# Patient Record
Sex: FEMALE | Race: ASIAN | NOT HISPANIC OR LATINO | Employment: FULL TIME | URBAN - METROPOLITAN AREA
[De-identification: names, ages, dates, MRNs, and addresses within clinical notes are randomized per-mention and may not be internally consistent; named-entity substitution may affect disease eponyms.]

---

## 2018-11-02 ENCOUNTER — TELEPHONE (OUTPATIENT)
Dept: INTERNAL MEDICINE | Facility: CLINIC | Age: 28
End: 2018-11-02

## 2018-11-05 ENCOUNTER — LAB VISIT (OUTPATIENT)
Dept: LAB | Facility: HOSPITAL | Age: 28
End: 2018-11-05
Attending: FAMILY MEDICINE
Payer: COMMERCIAL

## 2018-11-05 ENCOUNTER — OFFICE VISIT (OUTPATIENT)
Dept: INTERNAL MEDICINE | Facility: CLINIC | Age: 28
End: 2018-11-05
Payer: COMMERCIAL

## 2018-11-05 VITALS
BODY MASS INDEX: 21.14 KG/M2 | TEMPERATURE: 98 F | HEART RATE: 70 BPM | DIASTOLIC BLOOD PRESSURE: 60 MMHG | OXYGEN SATURATION: 96 % | SYSTOLIC BLOOD PRESSURE: 106 MMHG | HEIGHT: 62 IN | WEIGHT: 114.88 LBS

## 2018-11-05 DIAGNOSIS — Z13.1 SCREENING FOR DIABETES MELLITUS: ICD-10-CM

## 2018-11-05 DIAGNOSIS — Z30.41 ORAL CONTRACEPTIVE PILL SURVEILLANCE: ICD-10-CM

## 2018-11-05 DIAGNOSIS — Z13.220 SCREENING FOR LIPID DISORDERS: ICD-10-CM

## 2018-11-05 DIAGNOSIS — Z11.3 ROUTINE SCREENING FOR STI (SEXUALLY TRANSMITTED INFECTION): ICD-10-CM

## 2018-11-05 DIAGNOSIS — Z00.00 PREVENTATIVE HEALTH CARE: ICD-10-CM

## 2018-11-05 DIAGNOSIS — Z00.00 PREVENTATIVE HEALTH CARE: Primary | ICD-10-CM

## 2018-11-05 DIAGNOSIS — Z11.4 SCREENING FOR HIV WITHOUT PRESENCE OF RISK FACTORS: ICD-10-CM

## 2018-11-05 PROCEDURE — 86592 SYPHILIS TEST NON-TREP QUAL: CPT

## 2018-11-05 PROCEDURE — 90686 IIV4 VACC NO PRSV 0.5 ML IM: CPT | Mod: S$GLB,,, | Performed by: FAMILY MEDICINE

## 2018-11-05 PROCEDURE — 82947 ASSAY GLUCOSE BLOOD QUANT: CPT

## 2018-11-05 PROCEDURE — 36415 COLL VENOUS BLD VENIPUNCTURE: CPT | Mod: PO

## 2018-11-05 PROCEDURE — 80061 LIPID PANEL: CPT

## 2018-11-05 PROCEDURE — 99999 PR PBB SHADOW E&M-EST. PATIENT-LVL III: CPT | Mod: PBBFAC,,, | Performed by: FAMILY MEDICINE

## 2018-11-05 PROCEDURE — 99385 PREV VISIT NEW AGE 18-39: CPT | Mod: 25,S$GLB,, | Performed by: FAMILY MEDICINE

## 2018-11-05 PROCEDURE — 86703 HIV-1/HIV-2 1 RESULT ANTBDY: CPT

## 2018-11-05 PROCEDURE — 90471 IMMUNIZATION ADMIN: CPT | Mod: S$GLB,,, | Performed by: FAMILY MEDICINE

## 2018-11-05 RX ORDER — NORGESTIMATE AND ETHINYL ESTRADIOL 7DAYSX3 28
KIT ORAL
COMMUNITY
Start: 2018-09-24 | End: 2018-11-06 | Stop reason: SDUPTHER

## 2018-11-05 NOTE — PROGRESS NOTES
CHIEF COMPLAINT  Annual Exam      HISTORY OF PRESENT ILLNESS (PREVENTIVE SERVICES)    HEALTH MAINTENANCE INTERVENTIONS - UP TO DATE  Health Maintenance Topics with due status: Not Due       Topic Last Completion Date    Pap Smear 05/05/2016    TETANUS VACCINE 11/05/2017       HEALTH MAINTENANCE INTERVENTIONS - DUE OR DUE SOON  Health Maintenance Due   Topic Date Due    Lipid Panel  1992    HPV Vaccines (1 - Female 3-dose series) 09/22/2003       · HYPERTENSION SCREENING: negative  BP Readings from Last 3 Encounters:   11/05/18 106/60        · OBESITY SCREENING: negative  Wt Readings from Last 3 Encounters:   11/05/18 52.1 kg (114 lb 13.8 oz)     BMI Readings from Last 3 Encounters:   11/05/18 21.01 kg/m²        · DYSLIPIDEMIA SCREENING: ordered  No results found for: CHOL, TRIG, HDL, LDLCALC, NONHDLCHOL    · DIABETES SCREENING: ordered  No results found for: HGBA1C, GLU    · HIV SCREENING: ordered  No results found for: [id number]     · SEXUALLY TRANSMITTED INFECTION SCREENING: ordered  No results found for: LABCHLA, RPR     · TOBACCO USE SCREENING: negative  She  reports that  has never smoked. she has never used smokeless tobacco.    · ALCOHOL MISUSE SCREENING: negative  She  reports that she drinks about 1.2 oz of alcohol per week.    · DEPRESSION SCREENING: negative    · INTIMATE PARTNER VIOLENCE SCREENING: negative    · IMMUNIZATIONS: reported as up to date according to current CDC guidelines except for flu (done today). We are requesting old records.    · VISION SCREENING: current - not needed at this time     · CERVICAL CANCER SCREENING: Has GYN appointment later this week for Pap.    · BREAST CANCER SCREENING: not indicated    · FOLIC ACID SUPPLEMENTATION: recommended    · CONTRACEPTION: OCPs. She is satisfied with current contraception and wants to continue. She reports no history of venous thromboembolism or increased risk for such. She reports no history of migraines.    EVALUATION & MANAGEMENT  "UNRELATED TO PREVENTIVE SERVICES  · NONE     Problem List Items Addressed This Visit     None      Visit Diagnoses     Preventative health care    -  Primary    Relevant Orders    Lipid panel    GLUCOSE, RANDOM    HIV 1 / 2 ANTIBODY    RPR    PREGNANCY TEST, URINE RAPID (Completed)    Screening for HIV without presence of risk factors        Relevant Orders    HIV 1 / 2 ANTIBODY    Routine screening for STI (sexually transmitted infection)        Relevant Orders    RPR    Screening for diabetes mellitus        Relevant Orders    GLUCOSE, RANDOM    Screening for lipid disorders        Relevant Orders    Lipid panel    Oral contraceptive pill surveillance        Relevant Medications    norgestimate-ethinyl estradiol (ORTHO TRI-CYCLEN,TRI-SPRINTEC) 0.18/0.215/0.25 mg-35 mcg (28) tablet    Other Relevant Orders    PREGNANCY TEST, URINE RAPID (Completed)          REVIEW OF SYSTEMS  Answers for HPI/ROS submitted by the patient on 11/4/2018   activity change: No  unexpected weight change: No  neck pain: No  hearing loss: No  rhinorrhea: No  trouble swallowing: No  eye discharge: No  visual disturbance: No  chest tightness: No  wheezing: No  chest pain: No  palpitations: No  blood in stool: No  constipation: No  vomiting: No  diarrhea: No  polydipsia: No  polyuria: No  difficulty urinating: No  hematuria: No  menstrual problem: No  dysuria: No  joint swelling: No  arthralgias: No  headaches: No  weakness: No  confusion: No  dysphoric mood: No    PHYSICAL EXAM  Vitals:    11/05/18 1622   BP: 106/60   BP Location: Right arm   Patient Position: Sitting   BP Method: Medium (Manual)   Pulse: 70   Temp: 98.1 °F (36.7 °C)   TempSrc: Tympanic   SpO2: 96%   Weight: 52.1 kg (114 lb 13.8 oz)   Height: 5' 2" (1.575 m)     CONSTITUTIONAL: Vital signs noted. No apparent distress. Does not appear acutely ill or septic. Appears adequately hydrated.  EYE: Sclerae anicteric. Lids and conjunctiva unremarkable.  ENT: External ENT unremarkable. " Oropharynx moist. Lips and tongue unremarkable. Ear canals and visualized tympanic membranes are unremarkable. Hearing grossly intact.  NECK: Trachea midline. Thyroid nontender.  LYMPH: No cervical or supraclavicular lymphadenopathy.  PULM: Lungs clear. Breathing unlabored.  CV: Auscultation reveals regular rate and rhythm without murmur, gallop or rub. No carotid bruit.   GI: Soft and nontender. Bowel sounds normal. No appreciable organomegaly or abdominal mass on palpation. Abdominal aorta nonpalpable. No abdominal bruit.  DERM: Skin warm and moist with normal turgor.  NEURO: There are no gross focal motor deficits or gross deficits of cranial nerves III-XII.  PSYCH: Alert and oriented x 3. Mood is grossly neutral. Affect appropriate. Judgment and insight not grossly compromised.  MSK: Grossly normal stance and gait.    PAST MEDICAL HISTORY, FAMILY HISTORY, SOCIAL HISTORY, CURRENT MEDICATION LIST, and ALLERGY LIST reviewed by me (RENA Son MD) and are updated consistent with the patient's report.    ASSESSMENT and PLAN  Preventative health care  -     Lipid panel; Future; Expected date: 11/05/2018  -     GLUCOSE, RANDOM; Future; Expected date: 11/05/2018  -     HIV 1 / 2 ANTIBODY; Future; Expected date: 11/05/2018  -     RPR; Future; Expected date: 11/05/2018  -     PREGNANCY TEST, URINE RAPID; Future; Expected date: 11/05/2018    Screening for HIV without presence of risk factors  -     HIV 1 / 2 ANTIBODY; Future; Expected date: 11/05/2018    Routine screening for STI (sexually transmitted infection)  -     RPR; Future; Expected date: 11/05/2018    Screening for diabetes mellitus  -     GLUCOSE, RANDOM; Future; Expected date: 11/05/2018    Screening for lipid disorders  -     Lipid panel; Future; Expected date: 11/05/2018    Oral contraceptive pill surveillance  -     PREGNANCY TEST, URINE RAPID; Future; Expected date: 11/05/2018  -     norgestimate-ethinyl estradiol (ORTHO TRI-CYCLEN,TRI-SPRINTEC)  0.18/0.215/0.25 mg-35 mcg (28) tablet; Take 1 tablet by mouth once daily.  Dispense: 168 tablet; Refill: 2    Other orders  -     Influenza - Quadrivalent (3 years & older) (PF)           Medication List           Accurate as of 11/5/18 11:59 PM. If you have any questions, ask your nurse or doctor.               CHANGE how you take these medications    norgestimate-ethinyl estradiol 0.18/0.215/0.25 mg-35 mcg (28) tablet  Commonly known as:  ORTHO TRI-CYCLEN,TRI-SPRINTEC  Take 1 tablet by mouth once daily.  What changed:    · how much to take  · how to take this  · when to take this  Changed by:  CONRADO Son MD           Where to Get Your Medications      These medications were sent to [facility]/pharmacy #[id number] [address] [address]    Phone:  [phone]   · norgestimate-ethinyl estradiol 0.18/0.215/0.25 mg-35 mcg (28) tablet         Follow-up in about 1 year (around 11/5/2019) for wellness and preventive services.    ABOUT THIS DOCUMENTATION:  · The order of the conditions listed in the HPI is one of convenience and does not necessarily reflect the chronology of the appointment, nor the relative importance of a condition. It is possible that additional description or status details about condition(s) may be found elsewhere in the documentation for today's encounter.  · Documentation entered by me for this encounter was done in part using speech-recognition technology. Although I have made an effort to ensure accuracy, malapropisms may exist and should be interpreted in context.                        -RENA Son MD    There are no Patient Instructions on file for this visit.

## 2018-11-06 LAB
CHOLEST SERPL-MCNC: 189 MG/DL
CHOLEST/HDLC SERPL: 2.5 {RATIO}
GLUCOSE SERPL-MCNC: 84 MG/DL
HDLC SERPL-MCNC: 77 MG/DL
HDLC SERPL: 40.7 %
LDLC SERPL CALC-MCNC: 95.4 MG/DL
NONHDLC SERPL-MCNC: 112 MG/DL
TRIGL SERPL-MCNC: 83 MG/DL

## 2018-11-06 RX ORDER — NORGESTIMATE AND ETHINYL ESTRADIOL 7DAYSX3 28
1 KIT ORAL DAILY
Qty: 168 TABLET | Refills: 2 | Status: SHIPPED | OUTPATIENT
Start: 2018-11-06 | End: 2018-11-12 | Stop reason: SDUPTHER

## 2018-11-07 LAB
HIV 1+2 AB+HIV1 P24 AG SERPL QL IA: NEGATIVE
RPR SER QL: NORMAL

## 2018-11-10 ENCOUNTER — PATIENT MESSAGE (OUTPATIENT)
Dept: INTERNAL MEDICINE | Facility: CLINIC | Age: 28
End: 2018-11-10

## 2018-11-10 DIAGNOSIS — Z30.41 ORAL CONTRACEPTIVE PILL SURVEILLANCE: ICD-10-CM

## 2018-11-10 NOTE — PROGRESS NOTES
"Hi, Paris.    I have reviewed the results of recent tests you had done.    I'm happy to report that your test results are ALL NORMAL.    Your screening tests for HIV and syphilis are NEGATIVE (NORMAL).    Your lipid (cholesterol) panel shows that your cholesterol levels are EXCELLENT.    Your glucose (blood sugar level) is NORMAL.    If you have any questions about your test results, write them down and bring them with you to your next appointment. You can call or message me in the meantime if you have urgent questions.    Thank you for letting me care for you. I look forward to seeing you at your next appointment.    Sincerely,    RENA Son MD    P.S. - Want to learn more about your test results and what they mean? It's as simple as 1, 2, 3.     (1) Log in to your [facility] account at [email or web address]     (2) From the "View test results" tab, click on the test you want to know more about.     (3) Click on the "About This Test" link."

## 2018-11-12 RX ORDER — NORGESTIMATE AND ETHINYL ESTRADIOL 7DAYSX3 28
1 KIT ORAL DAILY
Qty: 168 TABLET | Refills: 2 | Status: SHIPPED | OUTPATIENT
Start: 2018-11-12

## 2018-11-12 NOTE — TELEPHONE ENCOUNTER
Patient is requesting medication to be sent to [facility]. As her insurance does not use [facility]. Please advise.//ddw

## 2018-11-12 NOTE — TELEPHONE ENCOUNTER
Paris Priec.    I sent your electronic prescription for norgestimate-ethinyl estradiol (ORTHO TRI-CYCLEN,TRI-SPRINTEC) 0.18/0.215/0.25 mg-35 mcg (28) tablet, a quantity of 168 tablet(s) with 2 additional refills to [facility] - [address], and the pharmacy confirmed their receipt of the prescription 11/12/2018 at 8:32 AM CST.    Thanks for letting me care for you.    Sincerely,    RENA Son MD

## 2018-12-18 ENCOUNTER — OFFICE VISIT (OUTPATIENT)
Dept: OBSTETRICS AND GYNECOLOGY | Facility: CLINIC | Age: 28
End: 2018-12-18
Payer: COMMERCIAL

## 2018-12-18 VITALS
DIASTOLIC BLOOD PRESSURE: 62 MMHG | SYSTOLIC BLOOD PRESSURE: 100 MMHG | BODY MASS INDEX: 22.6 KG/M2 | WEIGHT: 122.81 LBS | HEIGHT: 62 IN

## 2018-12-18 DIAGNOSIS — Z30.41 ORAL CONTRACEPTIVE PILL SURVEILLANCE: ICD-10-CM

## 2018-12-18 DIAGNOSIS — Z30.41 ENCOUNTER FOR SURVEILLANCE OF CONTRACEPTIVE PILLS: ICD-10-CM

## 2018-12-18 DIAGNOSIS — Z01.419 ENCOUNTER FOR GYNECOLOGICAL EXAMINATION WITHOUT ABNORMAL FINDING: Primary | ICD-10-CM

## 2018-12-18 PROCEDURE — 99999 PR PBB SHADOW E&M-EST. PATIENT-LVL III: CPT | Mod: PBBFAC,,, | Performed by: NURSE PRACTITIONER

## 2018-12-18 PROCEDURE — 99385 PREV VISIT NEW AGE 18-39: CPT | Mod: S$GLB,,, | Performed by: NURSE PRACTITIONER

## 2018-12-18 PROCEDURE — 88175 CYTOPATH C/V AUTO FLUID REDO: CPT

## 2018-12-18 NOTE — LETTER
December 18, 2018      No Recipients           [facility] - OB/ GYN  [address]  [place] [address]  Phone: [phone]  Fax: [phone]          Patient: Paris Levy   MR Number: [id number]   YOB: 1990   Date of Visit: 12/18/2018       Dear :    Thank you for referring Paris Levy to me for evaluation. Attached you will find relevant portions of my assessment and plan of care.    If you have questions, please do not hesitate to call me. I look forward to following Paris Leyv along with you.    Sincerely,    Ada Taveras, NP    Enclosure  CC:  No Recipients    If you would like to receive this communication electronically, please contact [email or web address] or [phone] to request more information on [facility] access.    For providers and/or their staff who would like to refer a patient to [facility], please contact us through our one-stop-shop provider referral line, Patrick Sullivan, at [phone].    If you feel you have received this communication in error or would no longer like to receive these types of communications, please e-mail [email or web address]

## 2018-12-18 NOTE — PATIENT INSTRUCTIONS
The Range of Pap Test Results  When your Pap test is sent to the lab, the lab studies your cell samples and reports any abnormal cell changes. Your health care provider can discuss these changes with you. In some cases, an abnormal Pap test is due to an infection. More serious cell changes range from dysplasia to cancer. Talk to your health care provider about your Pap test.    Normal results  Cervical cells, even normal ones, are always changing. As they mature, normal squamous cells move from deeper layers within the cervix. Over time, these cells flatten and cover the surface of the cervix. Within the cervical canal, the cells are different. These glandular cells are taller and not as flat as the cells on the surface of the cervix. When a Pap test sample shows healthy cells of both types, the results are negative. Keep having Pap tests as often as directed.  Abnormal results  A positive Pap test result means some cells in the sample showed abnormal changes. These results are grouped by the type of cell change and the location, or extent, of the changes. Depending on the results, you may need further testing.  · Inflammation: Noncancerous changes are present. They may be due to normal cell repair. Or, they may be caused by an infection, such as HPV or yeast. Further testing may be needed. (Also called reactive cellular changes.)  · Atypical squamous cells: Test results are unclear. Cells on the surface of the cervix show changes, but their significance is not yet known. Testing for HPV and other sexually transmitted infections (STIs) may be needed. Treatment may be required. (Reported as ASC-US or ASC-H.)  · Atypical glandular cells: Cells lining the cervical canal show abnormal changes. Further testing is likely. You may also have treatment to destroy or remove problem cells. (Reported as AGC.)  · Mild dysplasia: Cells show distinct changes. More testing or HPV typing may be done. You may also have treatment to  destroy or remove problem cells. (Reported as low-grade JUAN or THERESE 1.)  · Moderate to severe dysplasia: Cells show precancerous changes. Or, noninvasive cancer (carcinoma in situ) may be present. Treatment to destroy or remove problem cells is likely. (Reported as high-grade JUAN or THERESE 2 or THERESE 3.)  · Cancer: Different types of cancer may be detected by your Pap test. More tests to assess the cancer's extent are likely. The type of treatment will depend on the test results and other factors, such as age and health history. (Reported as squamous cell carcinoma, endocervical adenocarcinoma in situ, or adenocarcinoma.)  Date Last Reviewed: 5/12/2015  © 4868-6180 [employer]. [address] [place] [address], [place] [address]. All rights reserved. This information is not intended as a substitute for professional medical care. Always follow your healthcare professional's instructions.        Birth Control: The Pill    Birth control pills contain hormones that help prevent pregnancy. The pills are prescribed by your healthcare provider. There are many types of birth control pills available. If you have side effects from one type of pill, tell your healthcare provider. He or she may be able to prescribe a pill that works better for you.  Pregnancy rates  Talk to your healthcare provider about the effectiveness of this birth control method.  Using the pill  · Take one pill daily. Take it at around the same time each day.  · Follow your healthcare providers guidelines on when to start your first pack of pills. You may need to use another form of birth control for a week or more after you start.  · Know what to do if you forget to take a pill. (Consult your healthcare provider or check the package.) If you miss more than one pill, you may need to use a backup method of birth control for a week or more.  Pros  · Low pregnancy rate  · No interruption to sex  · Easy to use  · Can help make periods more regular  · May lower  your risk of ovarian cysts and certain cancers  · May decrease menstrual cramps, menstrual flow, and acne  Cons  · Does not protect against sexually transmitted infection (STIs)  · Requires taking a pill on time each day  · May not work as well when taken with certain other medicines (check with your pharmacist)  · May cause side effects such as nausea, irregular bleeding, headaches, breast tenderness, fatigue, or mood changes (these often go away within 3 months)  · May increase the risk of blood clots, heart attack, and stroke  The pill may not be for you  The pill may not be for you if:  · You are a smoker and over age 35  · You have high blood pressure or gallbladder, liver, cerebrovascular  or heart disease  · You have diabetes, migraines, blood clot in the vein or artery, lupus, depression, certain lipid disorders, or take medicines that interfere with the pill  In these cases, discuss the risks with your healthcare provider.  Date Last Reviewed: 3/1/2017  © 5017-2868 [employer]. [address] [place] [address], [place] [address]. All rights reserved. This information is not intended as a substitute for professional medical care. Always follow your healthcare professional's instructions.

## 2019-01-02 ENCOUNTER — PATIENT MESSAGE (OUTPATIENT)
Dept: OBSTETRICS AND GYNECOLOGY | Facility: CLINIC | Age: 29
End: 2019-01-02

## 2019-03-27 ENCOUNTER — OFFICE VISIT (OUTPATIENT)
Dept: OTOLARYNGOLOGY | Facility: CLINIC | Age: 29
End: 2019-03-27
Payer: COMMERCIAL

## 2019-03-27 VITALS
BODY MASS INDEX: 22.14 KG/M2 | SYSTOLIC BLOOD PRESSURE: 122 MMHG | HEART RATE: 74 BPM | TEMPERATURE: 99 F | WEIGHT: 121.06 LBS | DIASTOLIC BLOOD PRESSURE: 71 MMHG

## 2019-03-27 DIAGNOSIS — J30.89 NON-SEASONAL ALLERGIC RHINITIS, UNSPECIFIED TRIGGER: Primary | ICD-10-CM

## 2019-03-27 PROCEDURE — 99999 PR PBB SHADOW E&M-EST. PATIENT-LVL III: ICD-10-PCS | Mod: PBBFAC,,, | Performed by: ORTHOPAEDIC SURGERY

## 2019-03-27 PROCEDURE — 99203 OFFICE O/P NEW LOW 30 MIN: CPT | Mod: S$GLB,,, | Performed by: ORTHOPAEDIC SURGERY

## 2019-03-27 PROCEDURE — 99203 PR OFFICE/OUTPT VISIT, NEW, LEVL III, 30-44 MIN: ICD-10-PCS | Mod: S$GLB,,, | Performed by: ORTHOPAEDIC SURGERY

## 2019-03-27 PROCEDURE — 99999 PR PBB SHADOW E&M-EST. PATIENT-LVL III: CPT | Mod: PBBFAC,,, | Performed by: ORTHOPAEDIC SURGERY

## 2019-03-27 NOTE — PATIENT INSTRUCTIONS
"I would recommend the patient start on daily, consistent medication for allergies.  I would recommend daily use of an oral antihistamine as well as a steroid nasal spray.  There are a variety of oral antihistamines available over the counter, and one is not inherently the "best," though often patients will find that one works best for them.  We also discussed the proper mechanism of using a steroid nasal spray, to direct the spray away from the patient's nasal septum.  I would recommend continuing with these medications for 2-3 weeks, and then will have the patient contact me with progress.  "

## 2019-03-27 NOTE — PROGRESS NOTES
Subjective:       Patient ID: Paris Levy is a 28 y.o. female.    Chief Complaint: Otalgia (Bilateral) and Swollen lymph node right side of neck    Patient is a very pleasant 30 year old female here to see me today for evaluation of issues with her ears.  She has noted increased nasal drainage as well as sneezing and pain in her ears.  She says that the pain is intermittent, and can be in either ear.  She has noted that her ear pain is worse with swallowing.  She is able to swallow both solids and liquids, and denies any dysphagia or odynophagia.  She has not noted any hearing loss.  She does not generally an allergic individual, and does not feel that she suffers with seasonal allergies.  She has not had any fevers.    Review of Systems   Constitutional: Negative for chills, fatigue, fever and unexpected weight change.   HENT: Positive for congestion, ear pain, rhinorrhea (clear), sore throat and voice change. Negative for dental problem, ear discharge, facial swelling, hearing loss, nosebleeds, postnasal drip, sinus pressure, sneezing, tinnitus and trouble swallowing.    Eyes: Negative for redness, itching and visual disturbance.   Respiratory: Positive for cough. Negative for choking, shortness of breath and wheezing.    Cardiovascular: Negative for chest pain and palpitations.   Gastrointestinal: Negative for abdominal pain.        No reflux.   Musculoskeletal: Negative for gait problem.   Skin: Negative for rash.   Neurological: Negative for dizziness, light-headedness and headaches.       Objective:      Physical Exam   Constitutional: She is oriented to person, place, and time. She appears well-developed and well-nourished. No distress.   HENT:   Head: Normocephalic and atraumatic.   Right Ear: Tympanic membrane, external ear and ear canal normal.   Left Ear: Tympanic membrane, external ear and ear canal normal.   Nose: Mucosal edema and rhinorrhea present. No nasal deformity or septal deviation. No epistaxis.  Right sinus exhibits no maxillary sinus tenderness and no frontal sinus tenderness. Left sinus exhibits no maxillary sinus tenderness and no frontal sinus tenderness.   Mouth/Throat: Uvula is midline, oropharynx is clear and moist and mucous membranes are normal. Mucous membranes are not pale and not dry. No dental caries. No oropharyngeal exudate or posterior oropharyngeal erythema.   cobblestoning posterior pharyngeal wall   Eyes: Pupils are equal, round, and reactive to light. Conjunctivae, EOM and lids are normal. Right eye exhibits no chemosis. Left eye exhibits no chemosis. Right conjunctiva is not injected. Left conjunctiva is not injected. No scleral icterus. Right eye exhibits normal extraocular motion and no nystagmus. Left eye exhibits normal extraocular motion and no nystagmus.   Neck: Trachea normal and phonation normal. No tracheal tenderness present. No tracheal deviation present. No thyroid mass and no thyromegaly present.   Cardiovascular: Intact distal pulses.   Pulmonary/Chest: Effort normal. No stridor. No respiratory distress.   Abdominal: She exhibits no distension.   Lymphadenopathy:        Head (right side): No submental, no submandibular, no preauricular, no posterior auricular and no occipital adenopathy present.        Head (left side): No submental, no submandibular, no preauricular, no posterior auricular and no occipital adenopathy present.     She has no cervical adenopathy.   Neurological: She is alert and oriented to person, place, and time. No cranial nerve deficit.   Skin: Skin is warm and dry. No rash noted. No erythema.   Psychiatric: She has a normal mood and affect. Her behavior is normal.       Assessment:       1. Non-seasonal allergic rhinitis, unspecified trigger        Plan:       1.  AR:  I would recommend the patient start on daily, consistent medication for allergies.  I would recommend daily use of an oral antihistamine as well as a steroid nasal spray.  There are a  "variety of oral antihistamines available over the counter, and one is not inherently the "best," though often patients will find that one works best for them.  We also discussed the proper mechanism of using a steroid nasal spray, to direct the spray away from the patient's nasal septum.  I would recommend continuing with these medications for 2-3 weeks, and then will have the patient contact me with progress.  If allergy symptoms persist at that time, would then consider pursuing allergy skin testing and repeat evaluation.     "

## 2020-10-06 ENCOUNTER — PATIENT MESSAGE (OUTPATIENT)
Dept: ADMINISTRATIVE | Facility: HOSPITAL | Age: 30
End: 2020-10-06

## 2021-04-29 ENCOUNTER — PATIENT MESSAGE (OUTPATIENT)
Dept: RESEARCH | Facility: HOSPITAL | Age: 31
End: 2021-04-29